# Patient Record
Sex: MALE | Race: OTHER | HISPANIC OR LATINO | URBAN - METROPOLITAN AREA
[De-identification: names, ages, dates, MRNs, and addresses within clinical notes are randomized per-mention and may not be internally consistent; named-entity substitution may affect disease eponyms.]

---

## 2021-11-30 ENCOUNTER — EMERGENCY (EMERGENCY)
Facility: HOSPITAL | Age: 31
LOS: 1 days | Discharge: HOME | End: 2021-11-30
Attending: EMERGENCY MEDICINE
Payer: SELF-PAY

## 2021-11-30 VITALS
DIASTOLIC BLOOD PRESSURE: 79 MMHG | RESPIRATION RATE: 18 BRPM | SYSTOLIC BLOOD PRESSURE: 122 MMHG | OXYGEN SATURATION: 97 % | HEART RATE: 78 BPM | WEIGHT: 145.95 LBS | TEMPERATURE: 98 F

## 2021-11-30 VITALS
OXYGEN SATURATION: 99 % | RESPIRATION RATE: 20 BRPM | TEMPERATURE: 99 F | HEART RATE: 70 BPM | SYSTOLIC BLOOD PRESSURE: 111 MMHG | DIASTOLIC BLOOD PRESSURE: 61 MMHG

## 2021-11-30 LAB
ALBUMIN SERPL ELPH-MCNC: 4.7 G/DL — SIGNIFICANT CHANGE UP (ref 3.3–5.2)
ALP SERPL-CCNC: 107 U/L — SIGNIFICANT CHANGE UP (ref 40–120)
ALT FLD-CCNC: 24 U/L — SIGNIFICANT CHANGE UP
ANION GAP SERPL CALC-SCNC: 12 MMOL/L — SIGNIFICANT CHANGE UP (ref 5–17)
AST SERPL-CCNC: 21 U/L — SIGNIFICANT CHANGE UP
BASOPHILS # BLD AUTO: 0.02 K/UL — SIGNIFICANT CHANGE UP (ref 0–0.2)
BASOPHILS NFR BLD AUTO: 0.4 % — SIGNIFICANT CHANGE UP (ref 0–2)
BILIRUB SERPL-MCNC: 0.6 MG/DL — SIGNIFICANT CHANGE UP (ref 0.4–2)
BUN SERPL-MCNC: 10.6 MG/DL — SIGNIFICANT CHANGE UP (ref 8–20)
CALCIUM SERPL-MCNC: 9.2 MG/DL — SIGNIFICANT CHANGE UP (ref 8.6–10.2)
CHLORIDE SERPL-SCNC: 103 MMOL/L — SIGNIFICANT CHANGE UP (ref 98–107)
CO2 SERPL-SCNC: 24 MMOL/L — SIGNIFICANT CHANGE UP (ref 22–29)
CREAT SERPL-MCNC: 0.77 MG/DL — SIGNIFICANT CHANGE UP (ref 0.5–1.3)
EOSINOPHIL # BLD AUTO: 0.01 K/UL — SIGNIFICANT CHANGE UP (ref 0–0.5)
EOSINOPHIL NFR BLD AUTO: 0.2 % — SIGNIFICANT CHANGE UP (ref 0–6)
GLUCOSE SERPL-MCNC: 99 MG/DL — SIGNIFICANT CHANGE UP (ref 70–99)
HCT VFR BLD CALC: 44.7 % — SIGNIFICANT CHANGE UP (ref 39–50)
HGB BLD-MCNC: 15.9 G/DL — SIGNIFICANT CHANGE UP (ref 13–17)
HIV 1 & 2 AB SERPL IA.RAPID: SIGNIFICANT CHANGE UP
IMM GRANULOCYTES NFR BLD AUTO: 0.4 % — SIGNIFICANT CHANGE UP (ref 0–1.5)
LYMPHOCYTES # BLD AUTO: 1.1 K/UL — SIGNIFICANT CHANGE UP (ref 1–3.3)
LYMPHOCYTES # BLD AUTO: 20 % — SIGNIFICANT CHANGE UP (ref 13–44)
MCHC RBC-ENTMCNC: 30.4 PG — SIGNIFICANT CHANGE UP (ref 27–34)
MCHC RBC-ENTMCNC: 35.6 GM/DL — SIGNIFICANT CHANGE UP (ref 32–36)
MCV RBC AUTO: 85.5 FL — SIGNIFICANT CHANGE UP (ref 80–100)
MONOCYTES # BLD AUTO: 0.32 K/UL — SIGNIFICANT CHANGE UP (ref 0–0.9)
MONOCYTES NFR BLD AUTO: 5.8 % — SIGNIFICANT CHANGE UP (ref 2–14)
NEUTROPHILS # BLD AUTO: 4.03 K/UL — SIGNIFICANT CHANGE UP (ref 1.8–7.4)
NEUTROPHILS NFR BLD AUTO: 73.2 % — SIGNIFICANT CHANGE UP (ref 43–77)
PLATELET # BLD AUTO: 301 K/UL — SIGNIFICANT CHANGE UP (ref 150–400)
POTASSIUM SERPL-MCNC: 4.5 MMOL/L — SIGNIFICANT CHANGE UP (ref 3.5–5.3)
POTASSIUM SERPL-SCNC: 4.5 MMOL/L — SIGNIFICANT CHANGE UP (ref 3.5–5.3)
PROT SERPL-MCNC: 7.6 G/DL — SIGNIFICANT CHANGE UP (ref 6.6–8.7)
RBC # BLD: 5.23 M/UL — SIGNIFICANT CHANGE UP (ref 4.2–5.8)
RBC # FLD: 11.8 % — SIGNIFICANT CHANGE UP (ref 10.3–14.5)
SODIUM SERPL-SCNC: 139 MMOL/L — SIGNIFICANT CHANGE UP (ref 135–145)
TROPONIN T SERPL-MCNC: <0.01 NG/ML — SIGNIFICANT CHANGE UP (ref 0–0.06)
WBC # BLD: 5.5 K/UL — SIGNIFICANT CHANGE UP (ref 3.8–10.5)
WBC # FLD AUTO: 5.5 K/UL — SIGNIFICANT CHANGE UP (ref 3.8–10.5)

## 2021-11-30 PROCEDURE — 71046 X-RAY EXAM CHEST 2 VIEWS: CPT

## 2021-11-30 PROCEDURE — 99284 EMERGENCY DEPT VISIT MOD MDM: CPT | Mod: 25

## 2021-11-30 PROCEDURE — 84484 ASSAY OF TROPONIN QUANT: CPT

## 2021-11-30 PROCEDURE — 36415 COLL VENOUS BLD VENIPUNCTURE: CPT

## 2021-11-30 PROCEDURE — 85025 COMPLETE CBC W/AUTO DIFF WBC: CPT

## 2021-11-30 PROCEDURE — T1013: CPT

## 2021-11-30 PROCEDURE — 93005 ELECTROCARDIOGRAM TRACING: CPT

## 2021-11-30 PROCEDURE — 99053 MED SERV 10PM-8AM 24 HR FAC: CPT

## 2021-11-30 PROCEDURE — 99285 EMERGENCY DEPT VISIT HI MDM: CPT

## 2021-11-30 PROCEDURE — 80053 COMPREHEN METABOLIC PANEL: CPT

## 2021-11-30 PROCEDURE — 71046 X-RAY EXAM CHEST 2 VIEWS: CPT | Mod: 26

## 2021-11-30 PROCEDURE — 93010 ELECTROCARDIOGRAM REPORT: CPT

## 2021-11-30 PROCEDURE — 86703 HIV-1/HIV-2 1 RESULT ANTBDY: CPT

## 2021-11-30 NOTE — ED PROVIDER NOTE - PHYSICAL EXAMINATION
Gen: No acute distress, non toxic  HEENT: Mucous membranes moist, pink conjunctivae, EOMI  CV: RRR, nl s1/s2. equal pulses   Resp: CTAB, normal rate and effort  GI: Abdomen soft, NT, ND. No rebound, no guarding  : No CVAT  Neuro: A&O x 3, moving all 4 extremities  MSK: No spine or joint tenderness to palpation. no swelling b/l LE  Skin: No rashes. intact and perfused.

## 2021-11-30 NOTE — ED PROVIDER NOTE - PATIENT PORTAL LINK FT
You can access the FollowMyHealth Patient Portal offered by [facility] by registering at the following website: [email or web address]. By joining [employer]’s FollowMyHealth portal, you will also be able to view your health information using other applications (apps) compatible with our system.

## 2021-11-30 NOTE — ED ADULT NURSE NOTE - OBJECTIVE STATEMENT
Patient presenting to the ED with complaints of intermittent chest pain that radiates down the left arm and dizziness. Patient denied SOB and vomiting, but has some nausea. Pain was described as throbbing. No distress observed on exam. Patient speaking in full sentences.

## 2021-11-30 NOTE — ED PROVIDER NOTE - NSFOLLOWUPINSTRUCTIONS_ED_ALL_ED_FT
Dolor de pecho    El dolor de pecho puede ser causado por muchas condiciones diferentes que pueden ser peligrosas o no. Las causas incluyen acidez estomacal, infecciones pulmonares, infarto de miocardio, coágulos de ike en los pulmones, infecciones de la piel, distensión o daño a los músculos, cartílagos o huesos, etc. Además de la historia clínica y el examen físico, puede realizarse un electrocardiograma (ECG) u otras pruebas de laboratorio se morillo realizado para determinar la causa de kraft dolor de pecho. Donta un seguimiento con kraft proveedor de atención primaria o con un cardiólogo según las instrucciones.    BUSQUE ATENCIÓN MÉDICA INMEDIATA SI TIENE ALGUNO DE LOS SIGUIENTES SÍNTOMAS: empeoramiento del dolor en el pecho, tos con ike, dolor inexplicable de espalda / hellen / mandíbula, dolor abdominal intenso, mareos o aturdimiento, desmayos, dificultad para respirar, piel sudorosa o húmeda, vómitos, o latidos cardíacos acelerados. Estos síntomas pueden representar un problema grave que sea pedro emergencia. No espere a mich si los síntomas desaparecen. Obtenga ayuda médica de inmediato. Llame al 911 y no conduzca al hospital.

## 2021-11-30 NOTE — ED PROVIDER NOTE - OBJECTIVE STATEMENT
Wife helped translate. 32 y/o male no pmh c/o 4 days of intermittent atypical chest pain, non radiating, non exertional, non pleuritic. no sob, no f/c, no drugs, no hx dvt/pe, no travel, no cardiac hx in pt or family. no known trauma.     ROS: No fever/chills. No eye pain/changes in vision, No ear pain/sore throat/dysphagia, No SOB/cough/. No abdominal pain, N/V/D, no black/bloody bm. No dysuria/frequency/discharge, No headache. No Dizziness.    No rashes or breaks in skin. No numbness/tingling/weakness.

## 2022-04-18 NOTE — ED PROVIDER NOTE - PROGRESS NOTE DETAILS
Kindly advise on refill.     Esperanza: pt feeling well. trop and cxr wnl. cards f/u given. return precautions.

## 2023-01-28 ENCOUNTER — EMERGENCY (EMERGENCY)
Facility: HOSPITAL | Age: 33
LOS: 1 days | Discharge: HOME | End: 2023-01-28
Attending: STUDENT IN AN ORGANIZED HEALTH CARE EDUCATION/TRAINING PROGRAM
Payer: SELF-PAY

## 2023-01-28 VITALS
HEART RATE: 67 BPM | DIASTOLIC BLOOD PRESSURE: 79 MMHG | TEMPERATURE: 98 F | OXYGEN SATURATION: 98 % | RESPIRATION RATE: 20 BRPM | WEIGHT: 154.98 LBS | HEIGHT: 64 IN | SYSTOLIC BLOOD PRESSURE: 122 MMHG

## 2023-01-28 VITALS — DIASTOLIC BLOOD PRESSURE: 79 MMHG | SYSTOLIC BLOOD PRESSURE: 122 MMHG

## 2023-01-28 PROCEDURE — 70450 CT HEAD/BRAIN W/O DYE: CPT | Mod: 26,MD

## 2023-01-28 PROCEDURE — 99284 EMERGENCY DEPT VISIT MOD MDM: CPT

## 2023-01-28 PROCEDURE — 70450 CT HEAD/BRAIN W/O DYE: CPT | Mod: MD

## 2023-01-28 PROCEDURE — 99284 EMERGENCY DEPT VISIT MOD MDM: CPT | Mod: 25

## 2023-01-28 RX ORDER — MECLIZINE HCL 12.5 MG
25 TABLET ORAL ONCE
Refills: 0 | Status: COMPLETED | OUTPATIENT
Start: 2023-01-28 | End: 2023-01-28

## 2023-01-28 RX ORDER — ACETAMINOPHEN 500 MG
650 TABLET ORAL ONCE
Refills: 0 | Status: COMPLETED | OUTPATIENT
Start: 2023-01-28 | End: 2023-01-28

## 2023-01-28 RX ORDER — MECLIZINE HCL 12.5 MG
1 TABLET ORAL
Qty: 21 | Refills: 0
Start: 2023-01-28 | End: 2023-02-03

## 2023-01-28 RX ADMIN — Medication 650 MILLIGRAM(S): at 10:42

## 2023-01-28 RX ADMIN — Medication 25 MILLIGRAM(S): at 10:43

## 2023-01-28 NOTE — ED ADULT NURSE NOTE - CHIEF COMPLAINT QUOTE
Pt arrives to Ed c/o dizziness for 22 days - was eval by [facility]  and was given " medications" with improvement- symptoms returned this Tuesday also in addition to dizziness now c/o L facial numbness

## 2023-01-28 NOTE — ED STATDOCS - CLINICAL SUMMARY MEDICAL DECISION MAKING FREE TEXT BOX
ED : Ruth, 33 y/o male with no PMHx presents to the ED c/o 3 weeks of intermittent dizziness with position changes. Pt was seen at a clinic and told symptoms are likely an inner ear problem. Pt started on medication for 5 days, unsure of what medication. Pt started feeling better. Symptoms recurred now associated with HA, and left facial numbness for the last 5 days. Otherwise denies N/V, vision changes, CP, SOB, abdominal pain, weakness. Nonfocal neuro exam. Symptoms likely symptoms of vertigo. Will treat symptomatically. Will also CT head for subjective numbness.

## 2023-01-28 NOTE — ED STATDOCS - CARE PROVIDERS DIRECT ADDRESSES
[email or web address]

## 2023-01-28 NOTE — ED STATDOCS - OBJECTIVE STATEMENT
ED : Ruth, 31 y/o male with no PMHx presents to the ED c/o 3 weeks of intermittent dizziness with position changes. Pt was seen at a clinic and told symptoms are likely an inner ear problem. Pt started on medication for 5 days, unsure of what medication. Pt started feeling better. Symptoms recurred now associated with HA, and left facial numbness for the last 5 days. Otherwise denies N/V, vision changes, CP, SOB, abdominal pain, weakness.

## 2023-01-28 NOTE — ED ADULT TRIAGE NOTE - CHIEF COMPLAINT QUOTE
Pt arrives to Ed c/o dizziness for 22 days - was eval by [facility]  and was given " medications" with improvement- symptoms returned this Tuesday also in addition to dizziness now c/o L facial numbness

## 2023-01-28 NOTE — ED STATDOCS - CONDITION AT DISCHARGE:
-- DO NOT REPLY / DO NOT REPLY ALL --  -- Message is from Engagement Center Operations (ECO) --    Offered Waitlist if Available for the Visit Type? Yes    Caller is requesting an appointment - at a sooner time than what was available.      Caller wants sooner appointment - offered other approved options    Reason for Visit: 2 month follow up - patient missed his 10/10/22 appointment     Is the patient currently scheduled? No    Preferred time to be seen: any day/any time    Caller Information       Type Contact Phone/Fax    10/12/2022 11:03 AM CDT Phone (Incoming) Philippe Landin (Self) [phone] (M)          Alternative phone number: [phone]    Can a detailed message be left? Yes    Message Turnaround:     [place]:    Please give this turnaround time to the caller:   \"This message will be sent to [state Provider's name]. The clinical team will fulfill your request as soon as they review your message.\"  
Noted   
Improved

## 2023-01-28 NOTE — ED STATDOCS - CROS ED ROS STATEMENT
Normal vision: sees adequately in most situations; can see medication labels, newsprint
all other ROS negative except as per HPI

## 2023-01-28 NOTE — ED STATDOCS - CARE PROVIDER_API CALL
Anthony Hannah)  Neurology  [address], [address]  [place] [address]  Phone: [phone]  Fax: [phone]  Follow Up Time: 1-3 Days    Sheng Guillen)  Otolaryngology  [address] [place] [address], [address]  [place] [address]  Phone: [phone]  Fax: [phone]  Follow Up Time: 1-3 Days

## 2023-01-28 NOTE — ED STATDOCS - PATIENT PORTAL LINK FT
You can access the FollowMyHealth Patient Portal offered by [facility] by registering at the following website: [email or web address]. By joining [employer]’s FollowMyHealth portal, you will also be able to view your health information using other applications (apps) compatible with our system.

## 2023-01-28 NOTE — ED STATDOCS - PROVIDER TOKENS
PROVIDER:[TOKEN:[[id number]],FOLLOWUP:[1-3 Days]],PROVIDER:[TOKEN:[[id number]],FOLLOWUP:[1-3 Days]]

## 2023-01-28 NOTE — ED STATDOCS - NSFOLLOWUPINSTRUCTIONS_ED_ALL_ED_FT
Please take all medications as prescribed with food.   1)Follow up with your PCP within 2-3 days  2)Follow up with ENT clinic  within 2-3 days  3)Follow up with neurology clinic within 2-3 days  4)Return to the emergency room if you are experiencing any new or worsening symptoms

## 2023-01-28 NOTE — ED STATDOCS - PROGRESS NOTE DETAILS
Pt reports improvement of symptoms in the ED.   CT head: without any acute findings but showing 1.4 cm left frontal bone lesion probably represents hemangioma.  All pertinent results explained to patient and instructed to f/u with neurology/ENT/PCP within 2-3 days  Strict ED return precautions given if any new or worsening symptoms

## 2023-01-29 NOTE — ED POST DISCHARGE NOTE - DETAILS
spoke with pt. discussed CT results. advised to make apt with neuro. pt has neuro referral .pt verbalizes understanding and agreement

## 2023-01-31 PROBLEM — Z78.9 OTHER SPECIFIED HEALTH STATUS: Chronic | Status: ACTIVE | Noted: 2023-01-28

## 2023-02-04 PROBLEM — Z00.00 ENCOUNTER FOR PREVENTIVE HEALTH EXAMINATION: Status: ACTIVE | Noted: 2023-02-04

## 2023-05-24 ENCOUNTER — APPOINTMENT (OUTPATIENT)
Dept: NEUROLOGY | Facility: CLINIC | Age: 33
End: 2023-05-24

## 2023-11-28 ENCOUNTER — APPOINTMENT (OUTPATIENT)
Dept: NEUROLOGY | Facility: HOSPITAL | Age: 33
End: 2023-11-28
Payer: SELF-PAY

## 2023-11-28 ENCOUNTER — RESULT REVIEW (OUTPATIENT)
Age: 33
End: 2023-11-28

## 2023-11-28 ENCOUNTER — OUTPATIENT (OUTPATIENT)
Dept: OUTPATIENT SERVICES | Facility: HOSPITAL | Age: 33
LOS: 1 days | End: 2023-11-28
Payer: SELF-PAY

## 2023-11-28 VITALS
TEMPERATURE: 96.6 F | BODY MASS INDEX: 25.44 KG/M2 | DIASTOLIC BLOOD PRESSURE: 77 MMHG | HEART RATE: 76 BPM | WEIGHT: 149 LBS | SYSTOLIC BLOOD PRESSURE: 118 MMHG | HEIGHT: 64 IN

## 2023-11-28 DIAGNOSIS — G43.909 MIGRAINE, UNSPECIFIED, NOT INTRACTABLE, W/OUT STATUS MIGRAINOSUS: ICD-10-CM

## 2023-11-28 DIAGNOSIS — Z83.3 FAMILY HISTORY OF DIABETES MELLITUS: ICD-10-CM

## 2023-11-28 DIAGNOSIS — D18.09 HEMANGIOMA OF OTHER SITES: ICD-10-CM

## 2023-11-28 DIAGNOSIS — Z78.9 OTHER SPECIFIED HEALTH STATUS: ICD-10-CM

## 2023-11-28 DIAGNOSIS — Z83.438 FAMILY HISTORY OF OTHER DISORDER OF LIPOPROTEIN METABOLISM AND OTHER LIPIDEMIA: ICD-10-CM

## 2023-11-28 DIAGNOSIS — R51.9 HEADACHE, UNSPECIFIED: ICD-10-CM

## 2023-11-28 PROCEDURE — G0463: CPT

## 2023-11-28 PROCEDURE — 99204 OFFICE O/P NEW MOD 45 MIN: CPT

## 2023-11-30 DIAGNOSIS — G43.909 MIGRAINE, UNSPECIFIED, NOT INTRACTABLE, WITHOUT STATUS MIGRAINOSUS: ICD-10-CM

## 2023-12-09 ENCOUNTER — EMERGENCY (EMERGENCY)
Facility: HOSPITAL | Age: 33
LOS: 1 days | Discharge: HOME | End: 2023-12-09
Attending: EMERGENCY MEDICINE
Payer: SELF-PAY

## 2023-12-09 VITALS
HEART RATE: 79 BPM | RESPIRATION RATE: 20 BRPM | OXYGEN SATURATION: 98 % | HEIGHT: 64 IN | SYSTOLIC BLOOD PRESSURE: 130 MMHG | WEIGHT: 145.51 LBS | TEMPERATURE: 98 F | DIASTOLIC BLOOD PRESSURE: 76 MMHG

## 2023-12-09 LAB
OB PNL STL: NEGATIVE — SIGNIFICANT CHANGE UP
OB PNL STL: NEGATIVE — SIGNIFICANT CHANGE UP

## 2023-12-09 PROCEDURE — T1013: CPT

## 2023-12-09 PROCEDURE — 99284 EMERGENCY DEPT VISIT MOD MDM: CPT

## 2023-12-09 PROCEDURE — 82272 OCCULT BLD FECES 1-3 TESTS: CPT

## 2023-12-09 RX ORDER — SODIUM CHLORIDE 9 MG/ML
1000 INJECTION INTRAMUSCULAR; INTRAVENOUS; SUBCUTANEOUS ONCE
Refills: 0 | Status: COMPLETED | OUTPATIENT
Start: 2023-12-09 | End: 2023-12-09

## 2023-12-09 RX ORDER — DOCUSATE SODIUM 100 MG
1 CAPSULE ORAL
Qty: 28 | Refills: 0
Start: 2023-12-09 | End: 2023-12-22

## 2023-12-09 RX ORDER — HYDROCORTISONE 1 %
1 OINTMENT (GRAM) TOPICAL
Qty: 1 | Refills: 0
Start: 2023-12-09 | End: 2023-12-13

## 2023-12-09 RX ORDER — HYDROCORTISONE 1 %
1 OINTMENT (GRAM) TOPICAL ONCE
Refills: 0 | Status: COMPLETED | OUTPATIENT
Start: 2023-12-09 | End: 2023-12-09

## 2023-12-09 RX ADMIN — Medication 1 SUPPOSITORY(S): at 10:40

## 2023-12-09 NOTE — ED STATDOCS - GENITOURINARY, MLM
no bilateral CVA tenderness. Small anal fissure visualized. Rectal exam chaperoned by vi Carvajaler. normal...

## 2023-12-09 NOTE — ED STATDOCS - ATTENDING APP SHARED VISIT CONTRIBUTION OF CARE
I, Jayden Freedman, performed the initial face to face bedside interview with this patient regarding history of present illness, review of symptoms and relevant past medical, social and family history.  I completed an independent physical examination.  I was the initial provider who evaluated this patient. I have signed out the follow up of any pending tests (i.e. labs, radiological studies) to the ACP.  I have communicated the patient’s plan of care and disposition with the ACP.  The history, relevant review of systems, past medical and surgical history, medical decision making, and physical examination was documented by the scribe in my presence and I attest to the accuracy of the documentation.

## 2023-12-09 NOTE — ED STATDOCS - CARE PROVIDERS DIRECT ADDRESSES
,[email or web address]

## 2023-12-09 NOTE — ED STATDOCS - PATIENT PORTAL LINK FT
You can access the FollowMyHealth Patient Portal offered by [facility] by registering at the following website: [email or web address]. By joining [employer]’s FollowMyHealth portal, you will also be able to view your health information using other applications (apps) compatible with our system. You can access the FollowMyHealth Patient Portal offered by [facility] by registering at the following website: [email or web address]. By joining [employer]’s FollowMyHealth portal, you will also be able to view your health information using other applications (apps) compatible with our system.

## 2023-12-09 NOTE — ED STATDOCS - PROGRESS NOTE DETAILS
FOBT: negative  Rx anusol/colace and instructed to f/u with proctology clinic/PCP within 1 week.  Strict ED return precautions given if any new or worsening symptoms.

## 2023-12-09 NOTE — ED STATDOCS - CLINICAL SUMMARY MEDICAL DECISION MAKING FREE TEXT BOX
32 y/o male presents to ED c/o anal pain and blood in stool; with positive H-pylori exam, on triple therapy and on PE showing anal fissure. Will obtain labs, medicate for pain, stool culture taken. Pt has been constipated recently. If stool culture Is positive, will CT but will most likely discharge. 34 y/o male presents to ED c/o anal pain and blood in stool; with positive H-pylori exam, on triple therapy and on PE showing anal fissure. Will obtain labs, medicate for pain, stool culture taken. Pt has been constipated recently. If stool culture Is positive, will CT but will most likely discharge.

## 2023-12-09 NOTE — ED STATDOCS - OBJECTIVE STATEMENT
34 y/o male with no pertinent PMHx presents to ED c/o bloody stool x3 days with pain to the anus; no abdominal pain. No recent travel to other countries. No reptiles as pets. Stool is normal size but is a bit dark. Pt sees blood when wiping. Pt reports using laxatives for constipation a few days ago. Pt was diagnosed with H-pylori and was given antibiotics; has not yet finished course.   : Wei

## 2023-12-09 NOTE — ED STATDOCS - CARE PROVIDER_API CALL
Lisa Duckworth  Colon/Rectal Surgery  [address], [address] [place] [address]  Phone: [phone]  Fax: [phone]  Follow Up Time: 7-10 Days    Yarelis Dela Cruz  Colon/Rectal Surgery  [address], [place] [address]  Phone: [phone]  Fax: [phone]  Follow Up Time: 7-10 Days   Lisa Duckworth  Colon/Rectal Surgery  [address], [address] [address]  Phone: [phone]  Fax: [phone]  Follow Up Time: 7-10 Days    Yarelis Dela Cruz  Colon/Rectal Surgery  [address], [address] [address]  Phone: [phone]  Fax: [phone]  Follow Up Time: 7-10 Days

## 2023-12-09 NOTE — ED STATDOCS - NSFOLLOWUPINSTRUCTIONS_ED_ALL_ED_FT
Please take all medications as prescribed  1)Follow up with your PCP in 1 week  2) follow up with proctology clinic within 1 week  Return to the emergency room if you are experiencing any new or worsening symptoms

## 2023-12-09 NOTE — ED STATDOCS - PROVIDER TOKENS
PROVIDER:[TOKEN:[[id number]],FOLLOWUP:[7-10 Days]],PROVIDER:[TOKEN:[[id number]],FOLLOWUP:[7-10 Days]] PROVIDER:[TOKEN:[[id number]],FOLLOWUP:[7-10 Days]],PROVIDER:[TOKEN:[[id number]],FOLLOWUP:[7-10 Days]]

## 2023-12-12 ENCOUNTER — APPOINTMENT (OUTPATIENT)
Dept: MRI IMAGING | Facility: CLINIC | Age: 33
End: 2023-12-12
Payer: COMMERCIAL

## 2023-12-12 ENCOUNTER — OUTPATIENT (OUTPATIENT)
Dept: OUTPATIENT SERVICES | Facility: HOSPITAL | Age: 33
LOS: 1 days | End: 2023-12-12

## 2023-12-12 DIAGNOSIS — G43.909 MIGRAINE, UNSPECIFIED, NOT INTRACTABLE, WITHOUT STATUS MIGRAINOSUS: ICD-10-CM

## 2023-12-12 PROCEDURE — 70553 MRI BRAIN STEM W/O & W/DYE: CPT | Mod: 26

## 2023-12-20 ENCOUNTER — APPOINTMENT (OUTPATIENT)
Dept: NEUROLOGY | Facility: CLINIC | Age: 33
End: 2023-12-20

## 2024-01-09 ENCOUNTER — OUTPATIENT (OUTPATIENT)
Dept: OUTPATIENT SERVICES | Facility: HOSPITAL | Age: 34
LOS: 1 days | End: 2024-01-09
Payer: SELF-PAY

## 2024-01-09 ENCOUNTER — APPOINTMENT (OUTPATIENT)
Dept: NEUROLOGY | Facility: HOSPITAL | Age: 34
End: 2024-01-09
Payer: SELF-PAY

## 2024-01-09 VITALS
SYSTOLIC BLOOD PRESSURE: 119 MMHG | HEIGHT: 64 IN | BODY MASS INDEX: 24.92 KG/M2 | WEIGHT: 146 LBS | HEART RATE: 69 BPM | DIASTOLIC BLOOD PRESSURE: 74 MMHG | TEMPERATURE: 97.6 F | RESPIRATION RATE: 14 BRPM

## 2024-01-09 DIAGNOSIS — R51.9 HEADACHE, UNSPECIFIED: ICD-10-CM

## 2024-01-09 PROCEDURE — 99212 OFFICE O/P EST SF 10 MIN: CPT

## 2024-01-09 PROCEDURE — G0463: CPT

## 2024-01-09 NOTE — DISCUSSION/SUMMARY
[FreeTextEntry1] : 33y M with Hx L frontal hemangioma, presenting after being given Amitriptyline for 7 months of near-daily unilateral headaches w/ associated, dizziness, and blurry vision. Patient took the medication for a few days after which headache and associated symptoms resolved and he stopped the medication. No new episodes since early December 2023.  Plan: [] Can d/c Amitriptyline [] RTC to monitor hemangioma in 1 year

## 2024-01-09 NOTE — HISTORY OF PRESENT ILLNESS
[FreeTextEntry1] : 1/9/2024:  33y M with Hx skull lesion presents for follow up after starting amitriptyline for a stabbing 6 - 8/ 10 blurry vision, dizziness, posterior neck pain occurring after hitting his head in April 2023. He was planned for an up-taper from 10 mg QD for 1 week, 20 mg QD for the next week and 30 mg QD the week after though he only took 10 mg daily for a few days after which his headache and associated blurry vision and dizziness stopped. He stopped taking the Amitriptyline after the symptoms subsided. He reports his last episode of the headache with associated symptoms was the first week of December 2023. Patient was again told the results of his MRI brain with & without contrast which confirmed a known frontal hemangioma. Patient denies any current headache, nausea, vomiting, blurry vision, hearing changes, speech changes, focal weakness or sensory changes.  11/28/2023:  33y M with Hx skull lesion, for which he was referred to neurology clinic. In January 2023, felt like the room was spinning. If laid down or onto his L side, would get same sensation. Did not have headaches at that time. Was at its worst for 2 weeks, lasted up to 2 months. Also, had pain around L ear and felt tired. Went to [facility] ED and was incidentally found to have L paramedian frontal lesion, likely hemangioma. Was referred to neurology clinic for further evaluation. In the interim, he was at work in April 2023 and stood up too fast, hit the top of his head. Felt ok for 2 hours and did not report it. However, thereafter, started experiencing blurry vision, dizziness, posterior neck pain. Last for hours. Having symptoms 3-4x days per week. Gets better if he rests. Not exacerbated by changing head or body position. It varies in location but is usually unilateral. Feels stabbing or hot. Like a pin stabbing the top of his head. Pain varies in intensity between 6/10 to 8/10. Has b/l bilateral blurry vision. Feels like going to pass out. No actual LOC. Denies visual auras, unilateral numbness/weakness, vomiting. Sometimes a little nausea. Sometimes takes Tylenol, will help relieve pain on occasion. Not taking any other medications at this time. Was diagnosed w/ mild HLD and fatty liver. No surgeries. Family history of HLD and DM. NKDA. No smoking or alcohol. Works in construction.

## 2024-01-09 NOTE — PHYSICAL EXAM
[General Appearance - Alert] : alert [General Appearance - In No Acute Distress] : in no acute distress [General Appearance - Well Nourished] : well nourished [General Appearance - Well-Appearing] : healthy appearing [Oriented To Time, Place, And Person] : oriented to person, place, and time [Impaired Insight] : insight and judgment were intact [Affect] : the affect was normal [Person] : oriented to person [Place] : oriented to place [Time] : oriented to time [Cranial Nerves Optic (II)] : visual acuity intact bilaterally,  visual fields full to confrontation, pupils equal round and reactive to light [Cranial Nerves Oculomotor (III)] : extraocular motion intact [Cranial Nerves Trigeminal (V)] : facial sensation intact symmetrically [Cranial Nerves Facial (VII)] : face symmetrical [Cranial Nerves Vestibulocochlear (VIII)] : hearing was intact bilaterally [Cranial Nerves Glossopharyngeal (IX)] : tongue and palate midline [Cranial Nerves Accessory (XI - Cranial And Spinal)] : head turning and shoulder shrug symmetric [Cranial Nerves Hypoglossal (XII)] : there was no tongue deviation with protrusion [Motor Tone] : muscle tone was normal in all four extremities [Sensation Tactile Decrease] : light touch was intact [Sensation Pain / Temperature Decrease] : pain and temperature was intact [Abnormal Walk] : normal gait [2+] : Ankle jerk left 2+ [Sclera] : the sclera and conjunctiva were normal [PERRL With Normal Accommodation] : pupils were equal in size, round, reactive to light, with normal accommodation [Outer Ear] : the ears and nose were normal in appearance [Neck Appearance] : the appearance of the neck was normal [] : no respiratory distress

## 2024-01-10 DIAGNOSIS — G44.89 OTHER HEADACHE SYNDROME: ICD-10-CM

## 2024-03-17 ENCOUNTER — EMERGENCY (EMERGENCY)
Facility: HOSPITAL | Age: 34
LOS: 1 days | Discharge: HOME | End: 2024-03-17
Attending: STUDENT IN AN ORGANIZED HEALTH CARE EDUCATION/TRAINING PROGRAM
Payer: SELF-PAY

## 2024-03-17 VITALS
OXYGEN SATURATION: 99 % | TEMPERATURE: 98 F | DIASTOLIC BLOOD PRESSURE: 74 MMHG | WEIGHT: 142.86 LBS | HEART RATE: 102 BPM | SYSTOLIC BLOOD PRESSURE: 137 MMHG | RESPIRATION RATE: 16 BRPM | HEIGHT: 64 IN

## 2024-03-17 VITALS
DIASTOLIC BLOOD PRESSURE: 73 MMHG | TEMPERATURE: 98 F | SYSTOLIC BLOOD PRESSURE: 116 MMHG | HEART RATE: 69 BPM | OXYGEN SATURATION: 99 % | RESPIRATION RATE: 18 BRPM

## 2024-03-17 PROCEDURE — 96374 THER/PROPH/DIAG INJ IV PUSH: CPT

## 2024-03-17 PROCEDURE — 99284 EMERGENCY DEPT VISIT MOD MDM: CPT

## 2024-03-17 PROCEDURE — 99284 EMERGENCY DEPT VISIT MOD MDM: CPT | Mod: 25

## 2024-03-17 PROCEDURE — 93010 ELECTROCARDIOGRAM REPORT: CPT

## 2024-03-17 PROCEDURE — 93005 ELECTROCARDIOGRAM TRACING: CPT

## 2024-03-17 PROCEDURE — 82962 GLUCOSE BLOOD TEST: CPT

## 2024-03-17 PROCEDURE — T1013: CPT

## 2024-03-17 RX ORDER — SODIUM CHLORIDE 9 MG/ML
1000 INJECTION, SOLUTION INTRAVENOUS ONCE
Refills: 0 | Status: COMPLETED | OUTPATIENT
Start: 2024-03-17 | End: 2024-03-17

## 2024-03-17 RX ORDER — MECLIZINE HCL 12.5 MG
1 TABLET ORAL
Qty: 15 | Refills: 0
Start: 2024-03-17 | End: 2024-03-21

## 2024-03-17 RX ORDER — MECLIZINE HCL 12.5 MG
50 TABLET ORAL ONCE
Refills: 0 | Status: COMPLETED | OUTPATIENT
Start: 2024-03-17 | End: 2024-03-17

## 2024-03-17 RX ORDER — METOCLOPRAMIDE HCL 10 MG
10 TABLET ORAL ONCE
Refills: 0 | Status: COMPLETED | OUTPATIENT
Start: 2024-03-17 | End: 2024-03-17

## 2024-03-17 RX ADMIN — Medication 50 MILLIGRAM(S): at 14:21

## 2024-03-17 RX ADMIN — Medication 10 MILLIGRAM(S): at 14:21

## 2024-03-17 RX ADMIN — SODIUM CHLORIDE 1000 MILLILITER(S): 9 INJECTION, SOLUTION INTRAVENOUS at 14:22

## 2024-03-17 NOTE — ED PROVIDER NOTE - OBJECTIVE STATEMENT
33-year-old male with no PMH presents with HA and dizziness.    Patient states for the past week with intermittent headaches pulsating like, as well as feeling room spinning sensation when going from sitting to standing.  Patient states temporary relief of headache with Tylenol or ibuprofen.  Patient last took pain medication yesterday afternoon.   Patient denies any recent trauma or falls.   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 Pt denies fevers/chills, loc, focal neuro deficits, cp/sob/palp, cough, abd pain/n/v/d, urinary symptoms, recent travel and sick contacts.

## 2024-03-17 NOTE — ED PROVIDER NOTE - CARE PROVIDER_API CALL
Demian Villarreal  Neurology  [address] [place] [address], [facility] [address]  [place] [address]  Phone: [phone]  Fax: [phone]  Follow Up Time: 4-6 Days

## 2024-03-17 NOTE — ED ADULT TRIAGE NOTE - CHIEF COMPLAINT QUOTE
Pt with a h/o vertigo comes in c/o intermittent headache and dizziness for the last week. Has been taking Tylenol and Ibuprofen for his headache with no relief.

## 2024-03-17 NOTE — ED PROVIDER NOTE - CLINICAL SUMMARY MEDICAL DECISION MAKING FREE TEXT BOX
33-year-old male with no PMH presents with HA and dizziness.    Patient neuro intact here findings consistent with BPPV.  Will do symptomatic control as well as screening EKG.

## 2024-03-17 NOTE — ED PROVIDER NOTE - PHYSICAL EXAMINATION
Const: Awake, alert and oriented. In no acute distress. Well appearing.  HEENT: NC/AT. Moist mucous membranes.  Eyes: No scleral icterus. EOMI.  Neck:. Soft and supple. Full ROM without pain.  Cardiac: Regular rate and regular rhythm. +S1/S2. Peripheral pulses 2+ and symmetric. No LE edema.  Resp: Speaking in full sentences. No evidence of respiratory distress. No wheezes, rales or rhonchi.  Abd: Soft, non-tender, non-distended. Normal bowel sounds in all 4 quadrants. No guarding or rebound.  Back: Spine midline and non-tender. No CVAT.  Skin: No rashes, abrasions or lacerations.  Lymph: No cervical lymphadenopathy.  Neuro: A&Ox3, moving all extremities symmetrically, follows commands, motor dominick upper and lower ext 5/5, sensory symm and intact CN 2-12 grossly intact, no ataxia, no nystagmus, no dysmetria, no ddk, symm dominick, no pronator drift

## 2024-03-17 NOTE — ED ADULT NURSE NOTE - OBJECTIVE STATEMENT
Pt A&Ox4 ambulatory c/o HA and dizziness x 1 week. PMHx of vertigo. Denies cp, sob, n/v/d, fevers. Airway patent. Respirations even and unlabored.

## 2024-03-17 NOTE — ED PROVIDER NOTE - PROGRESS NOTE DETAILS
Patient labs within acceptable limits.  Patient reevaluation feels a lot better.  Patient states that he will follow-up with no[facility] where he is being seen by a neurologist there.  Patient states he is on a program where he is seen and followed up.  Patient DC in stable condition with Rx meclizine sent to patient pharmacy. Pt moved form intake Room. Pt seen and evaluated by intake Physician. HPI, Physical examination performed by intake Physician . Note reviewed and followup examination performed by me consistent with initial assessment. Agrees with intake Physician plan and tests.

## 2024-03-17 NOTE — ED PROVIDER NOTE - HIV OFFER
From: Jossie Vargas  To: Lauro Shirley DO  Sent: 8/10/2023 3:42 PM CDT  Subject: Request for Mammogram order     Hi Dr. Melissa Wilkins,    I hope you are having a fabulous summer! I need an updated screening mammogram order please. The order currently in my chart is for diagnostic not screening.      Thank you,  Jareth Chen Previously Declined (within the last year)

## 2024-03-17 NOTE — ED PROVIDER NOTE - PATIENT PORTAL LINK FT
You can access the FollowMyHealth Patient Portal offered by [facility] by registering at the following website: [email or web address]. By joining [employer]’s FollowMyHealth portal, you will also be able to view your health information using other applications (apps) compatible with our system.

## 2024-03-20 ENCOUNTER — OUTPATIENT (OUTPATIENT)
Dept: OUTPATIENT SERVICES | Facility: HOSPITAL | Age: 34
LOS: 1 days | End: 2024-03-20
Payer: SELF-PAY

## 2024-03-20 ENCOUNTER — NON-APPOINTMENT (OUTPATIENT)
Age: 34
End: 2024-03-20

## 2024-03-20 ENCOUNTER — APPOINTMENT (OUTPATIENT)
Dept: CARDIOLOGY | Facility: HOSPITAL | Age: 34
End: 2024-03-20

## 2024-03-20 ENCOUNTER — APPOINTMENT (OUTPATIENT)
Dept: ORTHOPEDIC SURGERY | Facility: HOSPITAL | Age: 34
End: 2024-03-20

## 2024-03-20 VITALS
TEMPERATURE: 98.8 F | RESPIRATION RATE: 16 BRPM | SYSTOLIC BLOOD PRESSURE: 122 MMHG | HEART RATE: 94 BPM | DIASTOLIC BLOOD PRESSURE: 81 MMHG

## 2024-03-20 VITALS
OXYGEN SATURATION: 99 % | HEART RATE: 91 BPM | SYSTOLIC BLOOD PRESSURE: 115 MMHG | WEIGHT: 141 LBS | BODY MASS INDEX: 24.07 KG/M2 | DIASTOLIC BLOOD PRESSURE: 72 MMHG | HEIGHT: 64 IN

## 2024-03-20 DIAGNOSIS — I25.10 ATHEROSCLEROTIC HEART DISEASE OF NATIVE CORONARY ARTERY WITHOUT ANGINA PECTORIS: ICD-10-CM

## 2024-03-20 DIAGNOSIS — M79.9 SOFT TISSUE DISORDER, UNSPECIFIED: ICD-10-CM

## 2024-03-20 DIAGNOSIS — R07.9 CHEST PAIN, UNSPECIFIED: ICD-10-CM

## 2024-03-20 PROCEDURE — G0463: CPT

## 2024-03-20 PROCEDURE — 93005 ELECTROCARDIOGRAM TRACING: CPT

## 2024-03-20 NOTE — ASSESSMENT
[FreeTextEntry1] : 33M w/ hx of migraines, presenting to cardiology clinic referred for two weeks of chest pain:  #Chest pain: - vital signs stable, exam unremarkable, EKG unremarkable - the patient's chest pain is typical in location with radiation to his L neck/arm and is associated with dyspnea and palpitations, but atypical in that the pain is not associated with exertion or improved with rest - A1c, lipids, Cr to work-up for further risk factors for ischemic heart disease - TTE - exercise treadmill stress test - return precautions provided  Return to clinic in 3 months after the above tests.

## 2024-03-20 NOTE — HISTORY OF PRESENT ILLNESS
[FreeTextEntry1] : 33M w/ hx of migraines, presenting to cardiology clinic referred for chest pain.  The pt denies prior cardiac history, denies prior MI/stents, cardiac surgery, arrhythmias. States for last 2 weeks has been experiencing chest pain. The pain did not occur every day, was located in L chest and radiated to his L arm and neck. Was not associated with exertion or improved with rest. Associated with dyspnea and palpitations. Otherwise denies recent syncope, orthopnea/pnd, LE swelling.  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 Does not regularly exercise.

## 2024-03-21 DIAGNOSIS — R07.9 CHEST PAIN, UNSPECIFIED: ICD-10-CM

## 2024-03-22 DIAGNOSIS — M54.2 CERVICALGIA: ICD-10-CM

## 2024-03-27 LAB
ANION GAP SERPL CALC-SCNC: 13 MMOL/L
BUN SERPL-MCNC: 14 MG/DL
CALCIUM SERPL-MCNC: 9.4 MG/DL
CHLORIDE SERPL-SCNC: 102 MMOL/L
CHOLEST SERPL-MCNC: 163 MG/DL
CO2 SERPL-SCNC: 26 MMOL/L
CREAT SERPL-MCNC: 0.85 MG/DL
EGFR: 116 ML/MIN/1.73M2
ESTIMATED AVERAGE GLUCOSE: 100 MG/DL
GLUCOSE SERPL-MCNC: 94 MG/DL
HBA1C MFR BLD HPLC: 5.1 %
HDLC SERPL-MCNC: 48 MG/DL
LDLC SERPL CALC-MCNC: 97 MG/DL
NONHDLC SERPL-MCNC: 115 MG/DL
POTASSIUM SERPL-SCNC: 3.9 MMOL/L
SODIUM SERPL-SCNC: 141 MMOL/L
TRIGL SERPL-MCNC: 94 MG/DL

## 2024-04-09 ENCOUNTER — APPOINTMENT (OUTPATIENT)
Dept: NEUROLOGY | Facility: HOSPITAL | Age: 34
End: 2024-04-09

## 2024-04-09 ENCOUNTER — OUTPATIENT (OUTPATIENT)
Dept: OUTPATIENT SERVICES | Facility: HOSPITAL | Age: 34
LOS: 1 days | End: 2024-04-09
Payer: SELF-PAY

## 2024-04-09 VITALS
DIASTOLIC BLOOD PRESSURE: 79 MMHG | RESPIRATION RATE: 15 BRPM | OXYGEN SATURATION: 99 % | SYSTOLIC BLOOD PRESSURE: 127 MMHG | WEIGHT: 146 LBS | TEMPERATURE: 97.9 F | BODY MASS INDEX: 24.92 KG/M2 | HEIGHT: 64 IN | HEART RATE: 77 BPM

## 2024-04-09 DIAGNOSIS — M54.2 CERVICALGIA: ICD-10-CM

## 2024-04-09 DIAGNOSIS — R51.9 HEADACHE, UNSPECIFIED: ICD-10-CM

## 2024-04-09 DIAGNOSIS — G43.809 OTHER MIGRAINE, NOT INTRACTABLE, W/OUT STATUS MIGRAINOSUS: ICD-10-CM

## 2024-04-09 PROCEDURE — G0463: CPT

## 2024-04-09 RX ORDER — NORTRIPTYLINE HYDROCHLORIDE 25 MG/1
25 CAPSULE ORAL
Qty: 30 | Refills: 3 | Status: ACTIVE | COMMUNITY
Start: 2024-04-09 | End: 1900-01-01

## 2024-04-09 RX ORDER — AMITRIPTYLINE HYDROCHLORIDE 10 MG/1
10 TABLET, FILM COATED ORAL DAILY
Qty: 130 | Refills: 0 | Status: DISCONTINUED | COMMUNITY
Start: 2023-11-28 | End: 2024-04-09

## 2024-04-09 NOTE — DISCUSSION/SUMMARY
[FreeTextEntry1] : 33 year old man right handed with PMH frontal bone hemangioma, cervicogenic headaches presenting for 3 month follow up. Patient reports developing left sided neck pain radiating throughout his whole head. MRi B w/w/o 12/2023 reviewed. Exam nonfocal.  Impression:  Cervicogenic headache  Plan: [] Start nortriptyline 25 mg at bedtime [] PT for neck pain [] Follow up in clinic in 3 months  Discussed with Dr. Nissenbaum.

## 2024-04-09 NOTE — HISTORY OF PRESENT ILLNESS
[FreeTextEntry1] : 33 year old man right handed with PMH frontal bone hemangioma, cervicogenic headaches presenting for 3 month follow up. Patient reports developing left sided neck pain radiating throughout his whole head. Dull ache occurring 2-3 times per week, non-positional, peak intensity 5/10. He had an episodes of left lateral arm numbness and left leg below the knee numbness lasting 5 days about 3 weeks ago, no weakness or changes to speech, difficulty walking. Numbness was a slow progression that fluctuated in intensity. He also had left sided neck pain as well during that time. He also reports difficulty concentrating, at times reading or doing math at least once per week when at work since a work accident in the summer. He hit the top of his head on a wooden bar. No LOC. He also reports generalized weakness as well and stress from work.

## 2024-04-09 NOTE — PHYSICAL EXAM
[General Appearance - Alert] : alert [Oriented To Time, Place, And Person] : oriented to person, place, and time [Impaired Insight] : insight and judgment were intact [Affect] : the affect was normal [Mood] : the mood was normal [Memory Recent] : recent memory was not impaired [Memory Remote] : remote memory was not impaired [Cranial Nerves Optic (II)] : visual acuity intact bilaterally,  visual fields full to confrontation, pupils equal round and reactive to light [Cranial Nerves Oculomotor (III)] : extraocular motion intact [Cranial Nerves Trigeminal (V)] : facial sensation intact symmetrically [Cranial Nerves Facial (VII)] : face symmetrical [Cranial Nerves Vestibulocochlear (VIII)] : hearing was intact bilaterally [Cranial Nerves Glossopharyngeal (IX)] : tongue and palate midline [Cranial Nerves Accessory (XI - Cranial And Spinal)] : head turning and shoulder shrug symmetric [Cranial Nerves Hypoglossal (XII)] : there was no tongue deviation with protrusion [Motor Tone] : muscle tone was normal in all four extremities [Motor Strength] : muscle strength was normal in all four extremities [Sensation Tactile Decrease] : light touch was intact [Sensation Pain / Temperature Decrease] : pain and temperature was intact [Sensation Vibration Decrease] : vibration was intact [Proprioception] : proprioception was intact [Abnormal Walk] : normal gait [Balance] : balance was intact [2+] : Ankle jerk left 2+ [Sclera] : the sclera and conjunctiva were normal [PERRL With Normal Accommodation] : pupils were equal in size, round, reactive to light, with normal accommodation [Extraocular Movements] : extraocular movements were intact [Full Visual Field] : full visual field [Paresis Pronator Drift Right-Sided] : no pronator drift on the right [Paresis Pronator Drift Left-Sided] : no pronator drift on the left [Plantar Reflex Right Only] : normal on the right [Plantar Reflex Left Only] : normal on the left

## 2024-04-18 ENCOUNTER — RESULT REVIEW (OUTPATIENT)
Age: 34
End: 2024-04-18

## 2024-04-19 ENCOUNTER — OUTPATIENT (OUTPATIENT)
Dept: OUTPATIENT SERVICES | Facility: HOSPITAL | Age: 34
LOS: 1 days | End: 2024-04-19
Payer: SELF-PAY

## 2024-04-19 ENCOUNTER — APPOINTMENT (OUTPATIENT)
Dept: CV DIAGNOSTICS | Facility: HOSPITAL | Age: 34
End: 2024-04-19

## 2024-04-19 DIAGNOSIS — M54.2 CERVICALGIA: ICD-10-CM

## 2024-04-19 PROCEDURE — 93017 CV STRESS TEST TRACING ONLY: CPT

## 2024-04-19 PROCEDURE — 93016 CV STRESS TEST SUPVJ ONLY: CPT

## 2024-04-19 PROCEDURE — 93018 CV STRESS TEST I&R ONLY: CPT

## 2024-05-15 ENCOUNTER — APPOINTMENT (OUTPATIENT)
Dept: ORTHOPEDIC SURGERY | Facility: HOSPITAL | Age: 34
End: 2024-05-15

## 2024-07-02 ENCOUNTER — APPOINTMENT (OUTPATIENT)
Dept: NEUROLOGY | Facility: HOSPITAL | Age: 34
End: 2024-07-02

## 2024-07-19 ENCOUNTER — OUTPATIENT (OUTPATIENT)
Dept: OUTPATIENT SERVICES | Facility: HOSPITAL | Age: 34
LOS: 1 days | End: 2024-07-19
Payer: SELF-PAY

## 2024-07-19 ENCOUNTER — APPOINTMENT (OUTPATIENT)
Dept: CV DIAGNOSITCS | Facility: HOSPITAL | Age: 34
End: 2024-07-19

## 2024-07-19 ENCOUNTER — RESULT REVIEW (OUTPATIENT)
Age: 34
End: 2024-07-19

## 2024-07-19 DIAGNOSIS — M54.2 CERVICALGIA: ICD-10-CM

## 2024-07-19 PROCEDURE — 93306 TTE W/DOPPLER COMPLETE: CPT

## 2024-07-19 PROCEDURE — 76376 3D RENDER W/INTRP POSTPROCES: CPT | Mod: 26

## 2024-07-19 PROCEDURE — 93306 TTE W/DOPPLER COMPLETE: CPT | Mod: 26

## 2024-07-19 PROCEDURE — 76376 3D RENDER W/INTRP POSTPROCES: CPT

## 2024-07-24 ENCOUNTER — NON-APPOINTMENT (OUTPATIENT)
Age: 34
End: 2024-07-24

## 2024-07-24 ENCOUNTER — APPOINTMENT (OUTPATIENT)
Dept: CARDIOLOGY | Facility: HOSPITAL | Age: 34
End: 2024-07-24

## 2024-07-24 VITALS
OXYGEN SATURATION: 99 % | DIASTOLIC BLOOD PRESSURE: 69 MMHG | SYSTOLIC BLOOD PRESSURE: 110 MMHG | BODY MASS INDEX: 25.06 KG/M2 | HEART RATE: 65 BPM | WEIGHT: 146 LBS

## 2024-07-24 DIAGNOSIS — R07.9 CHEST PAIN, UNSPECIFIED: ICD-10-CM

## 2024-07-24 NOTE — ASSESSMENT
[FreeTextEntry1] : 33M w/ hx of migraines, presenting to clinic for follow-up.  #Chest pain: improved, cardiac work-up negative - in 3/2024 with atypical chest pain for 2 weeks, now improved - vital signs stable, exam unremarkable, EKG unremarkable - TTE 7/2024 unremarkable - exercise stress test 4/2024 unremarkable - A1c 5.1% 3/2024 - lipids TG 94, Chol 163, HDL 48, LDL 97 3/2024 - Cr wnl 3/2024 - if chest pain recurs will consider coronary CTA - return precautions provided  Return to clinic in one year or as needed.

## 2024-07-24 NOTE — HISTORY OF PRESENT ILLNESS
[FreeTextEntry1] : : video [id number]  33M w/ hx of migraines, presenting to cardiology clinic for follow-up. Last seen by me 3/20/24.  At last visit pt reported atypical chest pain for 2 weeks, located in L chest and radiated to his L arm and neck. Was not associated with exertion or improved with rest. Associated with dyspnea and palpitations. Otherwise denied recent syncope, orthopnea/pnd, LE swelling.  Received TTE and exercise stress tests both unremarkable.  Pt today reports symptoms largely improved compared to last visit, chest pain now mild and occurring rarely.  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 Does not regularly exercise.  Cardiac tests: TTE 7/19/24: Left Ventricle: The left ventricular cavity is normal in size. Left ventricular wall thickness is normal. Left ventricular systolic function is normal with a calculated ejection fraction of 58 % by 3D. There are no regional wall motion abnormalities seen. There is normal left ventricular diastolic function. Right Ventricle: The right ventricular cavity is normal in size and right ventricular systolic function is normal. Tricuspid annular plane systolic excursion (TAPSE) is 2.6 cm (normal >=1.7 cm). Left Atrium: The left atrium is normal in size with an indexed volume of 27.84 ml/m. Right Atrium: The right atrium is normal in size. Interatrial Septum: The interatrial septum appears intact. Aortic Valve: Normal aortic valve. Mitral Valve: Normal mitral valve. Tricuspid Valve: Normal tricuspid valve. No evidence of pulmonary hypertension. Pulmonic Valve: Normal pulmonic valve. There is trace pulmonic regurgitation. Aorta: The aortic root appears normal in size. Pericardium: No pericardial effusion seen. Systemic Veins: The inferior vena cava is normal in size (normal <2.1cm) with normal inspiratory collapse (normal >50%) consistent with normal right atrial pressure ( R 3, range 0-5mmHg).  Exercise stress test 4/19/24: Conclusions:  1. Baseline electrocardiogram: normal sinus rhythm at a rate of 80 bpm with no sigificant ST abnomalities.  2. Stress electrocardiogram: No ischemic ST segment changes.  3. Arrhythmias: No arrhythmia associated with stress.  4. Patient achieved 13.4 METS, which is consistent with good exercise capacity.  5. The Duke Treadmill Score is 11.00, which is consistent with low risk (=5) for future cardiac events.

## 2024-10-28 ENCOUNTER — APPOINTMENT (OUTPATIENT)
Dept: UROLOGY | Facility: HOSPITAL | Age: 34
End: 2024-10-28

## 2024-12-03 ENCOUNTER — APPOINTMENT (OUTPATIENT)
Dept: GASTROENTEROLOGY | Facility: HOSPITAL | Age: 34
End: 2024-12-03